# Patient Record
Sex: FEMALE | URBAN - METROPOLITAN AREA
[De-identification: names, ages, dates, MRNs, and addresses within clinical notes are randomized per-mention and may not be internally consistent; named-entity substitution may affect disease eponyms.]

---

## 2022-11-01 ENCOUNTER — APPOINTMENT (RX ONLY)
Dept: URBAN - METROPOLITAN AREA CLINIC 6 | Facility: CLINIC | Age: 32
Setting detail: DERMATOLOGY
End: 2022-11-01

## 2022-11-01 DIAGNOSIS — D22 MELANOCYTIC NEVI: ICD-10-CM

## 2022-11-01 DIAGNOSIS — L82.1 OTHER SEBORRHEIC KERATOSIS: ICD-10-CM

## 2022-11-01 DIAGNOSIS — L81.4 OTHER MELANIN HYPERPIGMENTATION: ICD-10-CM

## 2022-11-01 DIAGNOSIS — L70.0 ACNE VULGARIS: ICD-10-CM

## 2022-11-01 DIAGNOSIS — Z71.89 OTHER SPECIFIED COUNSELING: ICD-10-CM

## 2022-11-01 DIAGNOSIS — D18.0 HEMANGIOMA: ICD-10-CM

## 2022-11-01 PROBLEM — D22.71 MELANOCYTIC NEVI OF RIGHT LOWER LIMB, INCLUDING HIP: Status: ACTIVE | Noted: 2022-11-01

## 2022-11-01 PROBLEM — D18.01 HEMANGIOMA OF SKIN AND SUBCUTANEOUS TISSUE: Status: ACTIVE | Noted: 2022-11-01

## 2022-11-01 PROBLEM — D22.61 MELANOCYTIC NEVI OF RIGHT UPPER LIMB, INCLUDING SHOULDER: Status: ACTIVE | Noted: 2022-11-01

## 2022-11-01 PROBLEM — D22.5 MELANOCYTIC NEVI OF TRUNK: Status: ACTIVE | Noted: 2022-11-01

## 2022-11-01 PROBLEM — D22.72 MELANOCYTIC NEVI OF LEFT LOWER LIMB, INCLUDING HIP: Status: ACTIVE | Noted: 2022-11-01

## 2022-11-01 PROBLEM — D22.62 MELANOCYTIC NEVI OF LEFT UPPER LIMB, INCLUDING SHOULDER: Status: ACTIVE | Noted: 2022-11-01

## 2022-11-01 PROCEDURE — ? PRESCRIPTION

## 2022-11-01 PROCEDURE — ? DIAGNOSIS COMMENT

## 2022-11-01 PROCEDURE — ? COUNSELING

## 2022-11-01 PROCEDURE — 99203 OFFICE O/P NEW LOW 30 MIN: CPT

## 2022-11-01 PROCEDURE — ? TREATMENT REGIMEN

## 2022-11-01 RX ORDER — CLINDAMYCIN PHOSPHATE 10 MG/ML
1 LOTION TOPICAL QD
Qty: 60 | Refills: 6 | Status: ERX | COMMUNITY
Start: 2022-11-01

## 2022-11-01 RX ADMIN — CLINDAMYCIN PHOSPHATE 1: 10 LOTION TOPICAL at 00:00

## 2022-11-01 ASSESSMENT — LOCATION ZONE DERM
LOCATION ZONE: LEG
LOCATION ZONE: FACE
LOCATION ZONE: ARM
LOCATION ZONE: TRUNK

## 2022-11-01 ASSESSMENT — LOCATION DETAILED DESCRIPTION DERM
LOCATION DETAILED: LOWER STERNUM
LOCATION DETAILED: RIGHT ANTERIOR DISTAL THIGH
LOCATION DETAILED: LEFT INFERIOR CENTRAL MALAR CHEEK
LOCATION DETAILED: RIGHT ANTECUBITAL SKIN
LOCATION DETAILED: RIGHT INFERIOR CENTRAL MALAR CHEEK
LOCATION DETAILED: LEFT PROXIMAL PRETIBIAL REGION
LOCATION DETAILED: RIGHT ANTERIOR DISTAL UPPER ARM
LOCATION DETAILED: EPIGASTRIC SKIN
LOCATION DETAILED: RIGHT VENTRAL PROXIMAL FOREARM
LOCATION DETAILED: LEFT ANTERIOR PROXIMAL UPPER ARM
LOCATION DETAILED: LEFT KNEE
LOCATION DETAILED: RIGHT KNEE
LOCATION DETAILED: LEFT ANTERIOR DISTAL THIGH
LOCATION DETAILED: LEFT VENTRAL PROXIMAL FOREARM
LOCATION DETAILED: PERIUMBILICAL SKIN
LOCATION DETAILED: RIGHT PROXIMAL PRETIBIAL REGION
LOCATION DETAILED: LEFT ANTERIOR DISTAL UPPER ARM
LOCATION DETAILED: RIGHT ANTERIOR PROXIMAL UPPER ARM

## 2022-11-01 ASSESSMENT — LOCATION SIMPLE DESCRIPTION DERM
LOCATION SIMPLE: RIGHT CHEEK
LOCATION SIMPLE: CHEST
LOCATION SIMPLE: LEFT PRETIBIAL REGION
LOCATION SIMPLE: RIGHT UPPER ARM
LOCATION SIMPLE: LEFT FOREARM
LOCATION SIMPLE: LEFT CHEEK
LOCATION SIMPLE: RIGHT THIGH
LOCATION SIMPLE: RIGHT FOREARM
LOCATION SIMPLE: RIGHT KNEE
LOCATION SIMPLE: LEFT UPPER ARM
LOCATION SIMPLE: ABDOMEN
LOCATION SIMPLE: LEFT THIGH
LOCATION SIMPLE: LEFT KNEE
LOCATION SIMPLE: RIGHT PRETIBIAL REGION

## 2022-11-01 NOTE — PROCEDURE: DIAGNOSIS COMMENT
Render Risk Assessment In Note?: no
Detail Level: Simple
Comment: Patient states this has been an issue for years. \\nShe has been prescribed multiple medications in the past including doxycycline and tretinoin. \\nMild presentation today. She is currently four weeks pregnant. I advised BPO wash and I will also prescribe clindamycin lotion.

## 2022-11-01 NOTE — PROCEDURE: TREATMENT REGIMEN
Hide [employer] Products: No
Sig For Treatment 1 (If Needed): once daily
Treatment 2: clindamycin 1%
Action 4: Continue
Action 1: Start
Treatment 1: benzoyl peroxide 5% wash
Detail Level: Zone

## 2022-11-01 NOTE — HPI: FULL BODY SKIN EXAMINATION
How Severe Are Your Spot(S)?: moderate
What Type Of Note Output Would You Prefer (Optional)?: Bullet Format
What Is The Reason For Today's Visit?: Full Body Skin Examination
What Is The Reason For Today's Visit? (Being Monitored For X): concerning skin lesions on an annual basis
Additional History: FBE. New patient.

## 2023-02-27 ENCOUNTER — OFFICE VISIT (OUTPATIENT)
Dept: MEDICAL GROUP | Facility: MEDICAL CENTER | Age: 33
End: 2023-02-27
Payer: OTHER GOVERNMENT

## 2023-02-27 VITALS
HEIGHT: 67 IN | WEIGHT: 132.5 LBS | DIASTOLIC BLOOD PRESSURE: 60 MMHG | SYSTOLIC BLOOD PRESSURE: 102 MMHG | OXYGEN SATURATION: 100 % | HEART RATE: 73 BPM | TEMPERATURE: 98.6 F | BODY MASS INDEX: 20.8 KG/M2

## 2023-02-27 DIAGNOSIS — Z00.00 PREVENTATIVE HEALTH CARE: ICD-10-CM

## 2023-02-27 DIAGNOSIS — Z11.59 ENCOUNTER FOR HEPATITIS C SCREENING TEST FOR LOW RISK PATIENT: ICD-10-CM

## 2023-02-27 DIAGNOSIS — L03.032 PARONYCHIA OF GREAT TOE OF LEFT FOOT: ICD-10-CM

## 2023-02-27 DIAGNOSIS — K21.9 GASTROESOPHAGEAL REFLUX DISEASE WITHOUT ESOPHAGITIS: ICD-10-CM

## 2023-02-27 DIAGNOSIS — J30.2 SEASONAL ALLERGIES: ICD-10-CM

## 2023-02-27 PROCEDURE — 99204 OFFICE O/P NEW MOD 45 MIN: CPT | Performed by: PHYSICIAN ASSISTANT

## 2023-02-27 RX ORDER — FLUTICASONE PROPIONATE 50 MCG
1 SPRAY, SUSPENSION (ML) NASAL DAILY
Qty: 48 G | Refills: 0 | Status: SHIPPED | OUTPATIENT
Start: 2023-02-27 | End: 2023-05-28

## 2023-02-27 RX ORDER — CEPHALEXIN 500 MG/1
1000 CAPSULE ORAL 2 TIMES DAILY
Qty: 28 CAPSULE | Refills: 0 | Status: SHIPPED | OUTPATIENT
Start: 2023-02-27 | End: 2023-03-06

## 2023-02-27 RX ORDER — FLUTICASONE PROPIONATE 50 MCG
1 SPRAY, SUSPENSION (ML) NASAL DAILY
COMMUNITY
End: 2023-02-27 | Stop reason: SDUPTHER

## 2023-02-27 RX ORDER — OMEPRAZOLE 20 MG/1
CAPSULE, DELAYED RELEASE ORAL
Status: ON HOLD | COMMUNITY
Start: 2022-07-26 | End: 2024-02-01

## 2023-02-27 ASSESSMENT — PATIENT HEALTH QUESTIONNAIRE - PHQ9: CLINICAL INTERPRETATION OF PHQ2 SCORE: 0

## 2023-02-27 NOTE — PROGRESS NOTES
Subjective:   Ceci Griffin is a 32 y.o. female here today for GERD and to establish care.    Gastroesophageal reflux disease without esophagitis  This is a pleasant 32-year-old female here today to establish care.  Moved from [place] to [place] about a year ago.   works for the [employer].  She is originally from [place].  Went to [place].  She has a history of reflux.  Had an upper endoscopy.  Found to have erosions.  Currently takes omeprazole as needed.  Does not need a prescription refill the medication.    Seasonal allergies  Has seasonal allergies.  Uses Flonase when needed.  Is requesting a renewal.  She will be trying to become pregnant again.  The last pregnancy was terminated secondary to an abnormal genetic profile.  She states that labs were ordered by her obstetrician late last year.    Paronychia of great toe of left foot  Planes of left great toe swelling next to the medial aspect of her toenail.  Complains of pain.  Has had other lesions on her distal toes at times which she was concerned about.  Today those are not apparent.       Current medicines (including changes today)  Current Outpatient Medications   Medication Sig Dispense Refill    omeprazole (PRILOSEC) 20 MG delayed-release capsule       fluticasone (FLONASE) 50 MCG/ACT nasal spray Administer 1 Spray into affected nostril(S) every day for 90 days. 48 g 0    cephALEXin (KEFLEX) 500 MG Cap Take 2 Capsules by mouth 2 times a day for 7 days. 28 Capsule 0     No current facility-administered medications for this visit.     She  has no past medical history on file.    Social History and Family History were reviewed and updated.    ROS   No chest pain, no shortness of breath, no abdominal pain and all other systems were reviewed and are negative.       Objective:     /60 (BP Location: Left arm, Patient Position: Sitting, BP Cuff Size: Adult)   Pulse 73   Temp 37 °C (98.6 °F) (Temporal)   Ht 1.69 m (5'  "6.54\")   Wt 60.1 kg (132 lb 7.9 oz)   SpO2 100%  Body mass index is 21.04 kg/m².   Physical Exam:  Constitutional: Alert, no distress.  Skin: Warm, dry, good turgor, no rashes in visible areas.  Eye: Equal, round and reactive, conjunctiva clear, lids normal.  ENMT: Lips without lesions, good dentition, oropharynx clear.  Neck: Trachea midline, no masses.   Lymph: No cervical or supraclavicular lymphadenopathy  Respiratory: Unlabored respiratory effort, lungs appear clear.  Psych: Alert and oriented x3, normal affect and mood.        Assessment and Plan:   The following treatment plan was discussed    1. Gastroesophageal reflux disease without esophagitis  Chronic condition.  Stable.  Intermittent symptoms.  Upper endoscopy in the past.  Continue omeprazole as needed.    2. Seasonal allergies  Chronic condition.  Requesting renewal of Flonase.  Prescribed.  Take as directed as needed.  - fluticasone (FLONASE) 50 MCG/ACT nasal spray; Administer 1 Spray into affected nostril(S) every day for 90 days.  Dispense: 48 g; Refill: 0    3. Paronychia of great toe of left foot  Acute, new onset condition.  Discussed self-limiting process.  Continue applying antibiotic ointment.  Salt soaks are appropriate.  Warm compresses.  Provided Keflex in case symptoms do not improve.  Advised likely will not have a reaction to Keflex but she does have a allergic reaction to amoxicillin.  - cephALEXin (KEFLEX) 500 MG Cap; Take 2 Capsules by mouth 2 times a day for 7 days.  Dispense: 28 Capsule; Refill: 0    4. Encounter for hepatitis C screening test for low risk patient  Hep C viral antibody ordered.  Check prior labs to see if necessary.  Nonfasting.  - HEP C VIRUS ANTIBODY; Future    5. Preventative health care  Order cholesterol profile.  Fast if needed.  - Lipid Profile; Future         Followup: Return in about 1 year (around 2/27/2024), or if symptoms worsen or fail to improve.    Please note that this dictation was created using " voice recognition software. I have made every reasonable attempt to correct obvious errors, but I expect that there are errors of grammar and possibly content that I did not discover before finalizing the note.

## 2023-02-27 NOTE — ASSESSMENT & PLAN NOTE
Has seasonal allergies.  Uses Flonase when needed.  Is requesting a renewal.  She will be trying to become pregnant again.  The last pregnancy was terminated secondary to an abnormal genetic profile.  She states that labs were ordered by her obstetrician late last year.

## 2023-02-27 NOTE — ASSESSMENT & PLAN NOTE
Planes of left great toe swelling next to the medial aspect of her toenail.  Complains of pain.  Has had other lesions on her distal toes at times which she was concerned about.  Today those are not apparent.

## 2023-08-11 NOTE — PROCEDURE: COUNSELING
[FreeTextEntry2] : Dr.Alisa Valentine
Detail Level: Zone
Sarecycline Counseling: Patient advised regarding possible photosensitivity and discoloration of the teeth, skin, lips, tongue and gums.  Patient instructed to avoid sunlight, if possible.  When exposed to sunlight, patients should wear protective clothing, sunglasses, and sunscreen.  The patient was instructed to call the office immediately if the following severe adverse effects occur:  hearing changes, easy bruising/bleeding, severe headache, or vision changes.  The patient verbalized understanding of the proper use and possible adverse effects of sarecycline.  All of the patient's questions and concerns were addressed.
Winlevi Pregnancy And Lactation Text: This medication is considered safe during pregnancy and breastfeeding.
Tazorac Counseling:  Patient advised that medication is irritating and drying.  Patient may need to apply sparingly and wash off after an hour before eventually leaving it on overnight.  The patient verbalized understanding of the proper use and possible adverse effects of tazorac.  All of the patient's questions and concerns were addressed.
Birth Control Pills Counseling: Birth Control Pill Counseling: I discussed with the patient the potential side effects of OCPs including but not limited to increased risk of stroke, heart attack, thrombophlebitis, deep venous thrombosis, hepatic adenomas, breast changes, GI upset, headaches, and depression.  The patient verbalized understanding of the proper use and possible adverse effects of OCPs. All of the patient's questions and concerns were addressed.
Erythromycin Pregnancy And Lactation Text: This medication is Pregnancy Category B and is considered safe during pregnancy. It is also excreted in breast milk.
Spironolactone Counseling: Patient advised regarding risks of diarrhea, abdominal pain, hyperkalemia, birth defects (for female patients), liver toxicity and renal toxicity. The patient may need blood work to monitor liver and kidney function and potassium levels while on therapy. The patient verbalized understanding of the proper use and possible adverse effects of spironolactone.  All of the patient's questions and concerns were addressed.
Azelaic Acid Pregnancy And Lactation Text: This medication is considered safe during pregnancy and breast feeding.
Topical Clindamycin Counseling: Patient counseled that this medication may cause skin irritation or allergic reactions.  In the event of skin irritation, the patient was advised to reduce the amount of the drug applied or use it less frequently.   The patient verbalized understanding of the proper use and possible adverse effects of clindamycin.  All of the patient's questions and concerns were addressed.
Isotretinoin Pregnancy And Lactation Text: This medication is Pregnancy Category X and is considered extremely dangerous during pregnancy. It is unknown if it is excreted in breast milk.
Dapsone Counseling: I discussed with the patient the risks of dapsone including but not limited to hemolytic anemia, agranulocytosis, rashes, methemoglobinemia, kidney failure, peripheral neuropathy, headaches, GI upset, and liver toxicity.  Patients who start dapsone require monitoring including baseline LFTs and weekly CBCs for the first month, then every month thereafter.  The patient verbalized understanding of the proper use and possible adverse effects of dapsone.  All of the patient's questions and concerns were addressed.
Azithromycin Counseling:  I discussed with the patient the risks of azithromycin including but not limited to GI upset, allergic reaction, drug rash, diarrhea, and yeast infections.
Dapsone Pregnancy And Lactation Text: This medication is Pregnancy Category C and is not considered safe during pregnancy or breast feeding.
Topical Clindamycin Pregnancy And Lactation Text: This medication is Pregnancy Category B and is considered safe during pregnancy. It is unknown if it is excreted in breast milk.
Spironolactone Pregnancy And Lactation Text: This medication can cause feminization of the male fetus and should be avoided during pregnancy. The active metabolite is also found in breast milk.
Benzoyl Peroxide Counseling: Patient counseled that medicine may cause skin irritation and bleach clothing.  In the event of skin irritation, the patient was advised to reduce the amount of the drug applied or use it less frequently.   The patient verbalized understanding of the proper use and possible adverse effects of benzoyl peroxide.  All of the patient's questions and concerns were addressed.
High Dose Vitamin A Counseling: Side effects reviewed, pt to contact office should one occur.
Tetracycline Pregnancy And Lactation Text: This medication is Pregnancy Category D and not consider safe during pregnancy. It is also excreted in breast milk.
Topical Retinoid counseling:  Patient advised to apply a pea-sized amount only at bedtime and wait 30 minutes after washing their face before applying.  If too drying, patient may add a non-comedogenic moisturizer. The patient verbalized understanding of the proper use and possible adverse effects of retinoids.  All of the patient's questions and concerns were addressed.
Topical Sulfur Applications Pregnancy And Lactation Text: This medication is Pregnancy Category C and has an unknown safety profile during pregnancy. It is unknown if this topical medication is excreted in breast milk.
Minocycline Counseling: Patient advised regarding possible photosensitivity and discoloration of the teeth, skin, lips, tongue and gums.  Patient instructed to avoid sunlight, if possible.  When exposed to sunlight, patients should wear protective clothing, sunglasses, and sunscreen.  The patient was instructed to call the office immediately if the following severe adverse effects occur:  hearing changes, easy bruising/bleeding, severe headache, or vision changes.  The patient verbalized understanding of the proper use and possible adverse effects of minocycline.  All of the patient's questions and concerns were addressed.
Bactrim Counseling:  I discussed with the patient the risks of sulfa antibiotics including but not limited to GI upset, allergic reaction, drug rash, diarrhea, dizziness, photosensitivity, and yeast infections.  Rarely, more serious reactions can occur including but not limited to aplastic anemia, agranulocytosis, methemoglobinemia, blood dyscrasias, liver or kidney failure, lung infiltrates or desquamative/blistering drug rashes.
Doxycycline Pregnancy And Lactation Text: This medication is Pregnancy Category D and not consider safe during pregnancy. It is also excreted in breast milk but is considered safe for shorter treatment courses.
Aklief Pregnancy And Lactation Text: It is unknown if this medication is safe to use during pregnancy.  It is unknown if this medication is excreted in breast milk.  Breastfeeding women should use the topical cream on the smallest area of the skin for the shortest time needed while breastfeeding.  Do not apply to nipple and areola.
Aklief counseling:  Patient advised to apply a pea-sized amount only at bedtime and wait 30 minutes after washing their face before applying.  If too drying, patient may add a non-comedogenic moisturizer.  The most commonly reported side effects including irritation, redness, scaling, dryness, stinging, burning, itching, and increased risk of sunburn.  The patient verbalized understanding of the proper use and possible adverse effects of retinoids.  All of the patient's questions and concerns were addressed.
Topical Retinoid Pregnancy And Lactation Text: This medication is Pregnancy Category C. It is unknown if this medication is excreted in breast milk.
Bactrim Pregnancy And Lactation Text: This medication is Pregnancy Category D and is known to cause fetal risk.  It is also excreted in breast milk.
Erythromycin Counseling:  I discussed with the patient the risks of erythromycin including but not limited to GI upset, allergic reaction, drug rash, diarrhea, increase in liver enzymes, and yeast infections.
Azelaic Acid Counseling: Patient counseled that medicine may cause skin irritation and to avoid applying near the eyes.  In the event of skin irritation, the patient was advised to reduce the amount of the drug applied or use it less frequently.   The patient verbalized understanding of the proper use and possible adverse effects of azelaic acid.  All of the patient's questions and concerns were addressed.
Tazorac Pregnancy And Lactation Text: This medication is not safe during pregnancy. It is unknown if this medication is excreted in breast milk.
Isotretinoin Counseling: Patient should get monthly blood tests, not donate blood, not drive at night if vision affected, not share medication, and not undergo elective surgery for 6 months after tx completed. Side effects reviewed, pt to contact office should one occur.
Birth Control Pills Pregnancy And Lactation Text: This medication should be avoided if pregnant and for the first 30 days post-partum.
Use Enhanced Medication Counseling?: No
Tetracycline Counseling: Patient counseled regarding possible photosensitivity and increased risk for sunburn.  Patient instructed to avoid sunlight, if possible.  When exposed to sunlight, patients should wear protective clothing, sunglasses, and sunscreen.  The patient was instructed to call the office immediately if the following severe adverse effects occur:  hearing changes, easy bruising/bleeding, severe headache, or vision changes.  The patient verbalized understanding of the proper use and possible adverse effects of tetracycline.  All of the patient's questions and concerns were addressed. Patient understands to avoid pregnancy while on therapy due to potential birth defects.
Benzoyl Peroxide Pregnancy And Lactation Text: This medication is Pregnancy Category C. It is unknown if benzoyl peroxide is excreted in breast milk.
Azithromycin Pregnancy And Lactation Text: This medication is considered safe during pregnancy and is also secreted in breast milk.
Doxycycline Counseling:  Patient counseled regarding possible photosensitivity and increased risk for sunburn.  Patient instructed to avoid sunlight, if possible.  When exposed to sunlight, patients should wear protective clothing, sunglasses, and sunscreen.  The patient was instructed to call the office immediately if the following severe adverse effects occur:  hearing changes, easy bruising/bleeding, severe headache, or vision changes.  The patient verbalized understanding of the proper use and possible adverse effects of doxycycline.  All of the patient's questions and concerns were addressed.
High Dose Vitamin A Pregnancy And Lactation Text: High dose vitamin A therapy is contraindicated during pregnancy and breast feeding.
Topical Sulfur Applications Counseling: Topical Sulfur Counseling: Patient counseled that this medication may cause skin irritation or allergic reactions.  In the event of skin irritation, the patient was advised to reduce the amount of the drug applied or use it less frequently.   The patient verbalized understanding of the proper use and possible adverse effects of topical sulfur application.  All of the patient's questions and concerns were addressed.
Detail Level: Detailed
Winlevi Counseling:  I discussed with the patient the risks of topical clascoterone including but not limited to erythema, scaling, itching, and stinging. Patient voiced their understanding.

## 2024-01-31 NOTE — H&P
DATE OF SERVICE:  2024     CHIEF COMPLAINT:  Postdates induction of labor.     HISTORY OF PRESENT ILLNESS:  The patient is a 33-year-old female, who is a    2, para 0, who will be 41 weeks' gestational age.  She will be   presenting to the hospital to undergo an induction of labor secondary to   above.  The patient's pregnancy has been uncomplicated.  She has been   following with [facility] and had appropriate ultrasounds   with the last being at 32 weeks showing an estimated fetal weight of 18th   percentile.     PAST MEDICAL HISTORY:  Consists of reflux disease.     PAST SURGICAL HISTORY:  Includes an endoscopy related to reflux issues.     SOCIAL HISTORY:  Negative.     ALLERGIES:  THE PATIENT IS ALLERGIC TO ALL PENICILLINS.     CURRENT MEDICATIONS:  Include a prenatal vitamin.     LABORATORY DATA:  Show a blood type of AB positive, rubella immune.  Her group   B strep culture is negative.     PHYSICAL EXAMINATION:   VITAL SIGNS:  Have always been stable.   CARDIOVASCULAR:  Regular rate and rhythm.  LUNGS:  Clear.  ABDOMEN:  Gravid.  PELVIC:  Vaginal exam was too high to reach in the office at 37 and 1/2 weeks   and she has declined vaginal exams since then.  EXTREMITIES:  Show no clubbing, cyanosis or edema.     ASSESSMENT AND PLAN:  This is a 33-year-old female who is a  2, para 0,   patient is going to be presenting to the hospital to undergo postdates   induction of labor.  Most likely, Cytotec will be used followed by either a   balloon or Pitocin.  Decision will be made by the covering provider at that   time.        ______________________________  Corinne E. Capurro, MD [facility]    DD:  2024 20:01  DT:  2024 20:14    Job#:  [id number]

## 2024-02-01 ENCOUNTER — APPOINTMENT (OUTPATIENT)
Dept: OBGYN | Facility: MEDICAL CENTER | Age: 34
End: 2024-02-01
Attending: OBSTETRICS & GYNECOLOGY
Payer: COMMERCIAL

## 2024-02-01 ENCOUNTER — HOSPITAL ENCOUNTER (INPATIENT)
Facility: MEDICAL CENTER | Age: 34
LOS: 2 days | End: 2024-02-03
Attending: OBSTETRICS & GYNECOLOGY | Admitting: OBSTETRICS & GYNECOLOGY
Payer: COMMERCIAL

## 2024-02-01 PROCEDURE — 36415 COLL VENOUS BLD VENIPUNCTURE: CPT

## 2024-02-01 PROCEDURE — 86780 TREPONEMA PALLIDUM: CPT

## 2024-02-01 PROCEDURE — 770002 HCHG ROOM/CARE - OB PRIVATE (112)

## 2024-02-01 PROCEDURE — 85025 COMPLETE CBC W/AUTO DIFF WBC: CPT

## 2024-02-01 RX ORDER — ONDANSETRON 2 MG/ML
4 INJECTION INTRAMUSCULAR; INTRAVENOUS EVERY 6 HOURS PRN
Status: DISCONTINUED | OUTPATIENT
Start: 2024-02-01 | End: 2024-02-02 | Stop reason: HOSPADM

## 2024-02-01 RX ORDER — IBUPROFEN 800 MG/1
800 TABLET, FILM COATED ORAL
Status: DISCONTINUED | OUTPATIENT
Start: 2024-02-01 | End: 2024-02-02 | Stop reason: HOSPADM

## 2024-02-01 RX ORDER — LIDOCAINE HYDROCHLORIDE 10 MG/ML
20 INJECTION, SOLUTION INFILTRATION; PERINEURAL
Status: COMPLETED | OUTPATIENT
Start: 2024-02-01 | End: 2024-02-02

## 2024-02-01 RX ORDER — ONDANSETRON 4 MG/1
4 TABLET, ORALLY DISINTEGRATING ORAL EVERY 6 HOURS PRN
Status: DISCONTINUED | OUTPATIENT
Start: 2024-02-01 | End: 2024-02-02 | Stop reason: HOSPADM

## 2024-02-01 RX ORDER — OXYTOCIN 10 [USP'U]/ML
10 INJECTION, SOLUTION INTRAMUSCULAR; INTRAVENOUS
Status: DISCONTINUED | OUTPATIENT
Start: 2024-02-01 | End: 2024-02-02 | Stop reason: HOSPADM

## 2024-02-01 RX ORDER — SODIUM CHLORIDE, SODIUM LACTATE, POTASSIUM CHLORIDE, CALCIUM CHLORIDE 600; 310; 30; 20 MG/100ML; MG/100ML; MG/100ML; MG/100ML
INJECTION, SOLUTION INTRAVENOUS CONTINUOUS
Status: DISCONTINUED | OUTPATIENT
Start: 2024-02-02 | End: 2024-02-03

## 2024-02-01 RX ORDER — TERBUTALINE SULFATE 1 MG/ML
0.25 INJECTION, SOLUTION SUBCUTANEOUS
Status: DISCONTINUED | OUTPATIENT
Start: 2024-02-01 | End: 2024-02-02 | Stop reason: HOSPADM

## 2024-02-01 RX ORDER — ACETAMINOPHEN 500 MG
1000 TABLET ORAL
Status: DISCONTINUED | OUTPATIENT
Start: 2024-02-01 | End: 2024-02-02 | Stop reason: HOSPADM

## 2024-02-01 ASSESSMENT — LIFESTYLE VARIABLES
ALCOHOL_USE: NO
EVER_SMOKED: NEVER

## 2024-02-01 ASSESSMENT — PATIENT HEALTH QUESTIONNAIRE - PHQ9
1. LITTLE INTEREST OR PLEASURE IN DOING THINGS: NOT AT ALL
SUM OF ALL RESPONSES TO PHQ9 QUESTIONS 1 AND 2: 0
2. FEELING DOWN, DEPRESSED, IRRITABLE, OR HOPELESS: NOT AT ALL

## 2024-02-02 LAB
BASOPHILS # BLD AUTO: 0.5 % (ref 0–1.8)
BASOPHILS # BLD: 0.03 K/UL (ref 0–0.12)
EOSINOPHIL # BLD AUTO: 0.1 K/UL (ref 0–0.51)
EOSINOPHIL NFR BLD: 1.8 % (ref 0–6.9)
ERYTHROCYTE [DISTWIDTH] IN BLOOD BY AUTOMATED COUNT: 41.4 FL (ref 35.9–50)
ERYTHROCYTE [DISTWIDTH] IN BLOOD BY AUTOMATED COUNT: 41.8 FL (ref 35.9–50)
HCT VFR BLD AUTO: 32 % (ref 37–47)
HCT VFR BLD AUTO: 36.3 % (ref 37–47)
HGB BLD-MCNC: 11.7 G/DL (ref 12–16)
HGB BLD-MCNC: 12.8 G/DL (ref 12–16)
HOLDING TUBE BB 8507: NORMAL
IMM GRANULOCYTES # BLD AUTO: 0.02 K/UL (ref 0–0.11)
IMM GRANULOCYTES NFR BLD AUTO: 0.4 % (ref 0–0.9)
LYMPHOCYTES # BLD AUTO: 1.23 K/UL (ref 1–4.8)
LYMPHOCYTES NFR BLD: 21.8 % (ref 22–41)
MCH RBC QN AUTO: 33.8 PG (ref 27–33)
MCH RBC QN AUTO: 35 PG (ref 27–33)
MCHC RBC AUTO-ENTMCNC: 35.3 G/DL (ref 32.2–35.5)
MCHC RBC AUTO-ENTMCNC: 36.6 G/DL (ref 32.2–35.5)
MCV RBC AUTO: 95.8 FL (ref 81.4–97.8)
MCV RBC AUTO: 95.8 FL (ref 81.4–97.8)
MONOCYTES # BLD AUTO: 0.56 K/UL (ref 0–0.85)
MONOCYTES NFR BLD AUTO: 9.9 % (ref 0–13.4)
NEUTROPHILS # BLD AUTO: 3.7 K/UL (ref 1.82–7.42)
NEUTROPHILS NFR BLD: 65.6 % (ref 44–72)
NRBC # BLD AUTO: 0 K/UL
NRBC BLD-RTO: 0 /100 WBC (ref 0–0.2)
PLATELET # BLD AUTO: 195 K/UL (ref 164–446)
PLATELET # BLD AUTO: 232 K/UL (ref 164–446)
PMV BLD AUTO: 10.3 FL (ref 9–12.9)
PMV BLD AUTO: 11 FL (ref 9–12.9)
RBC # BLD AUTO: 3.34 M/UL (ref 4.2–5.4)
RBC # BLD AUTO: 3.79 M/UL (ref 4.2–5.4)
T PALLIDUM AB SER QL IA: NORMAL
WBC # BLD AUTO: 5.6 K/UL (ref 4.8–10.8)
WBC # BLD AUTO: 9.6 K/UL (ref 4.8–10.8)

## 2024-02-02 PROCEDURE — 700102 HCHG RX REV CODE 250 W/ 637 OVERRIDE(OP): Performed by: OBSTETRICS & GYNECOLOGY

## 2024-02-02 PROCEDURE — 700105 HCHG RX REV CODE 258: Performed by: OBSTETRICS & GYNECOLOGY

## 2024-02-02 PROCEDURE — 85027 COMPLETE CBC AUTOMATED: CPT

## 2024-02-02 PROCEDURE — 0HQ9XZZ REPAIR PERINEUM SKIN, EXTERNAL APPROACH: ICD-10-PCS | Performed by: OBSTETRICS & GYNECOLOGY

## 2024-02-02 PROCEDURE — 59409 OBSTETRICAL CARE: CPT

## 2024-02-02 PROCEDURE — 770002 HCHG ROOM/CARE - OB PRIVATE (112)

## 2024-02-02 PROCEDURE — A9270 NON-COVERED ITEM OR SERVICE: HCPCS | Performed by: OBSTETRICS & GYNECOLOGY

## 2024-02-02 PROCEDURE — 700111 HCHG RX REV CODE 636 W/ 250 OVERRIDE (IP): Mod: JZ | Performed by: OBSTETRICS & GYNECOLOGY

## 2024-02-02 PROCEDURE — 304965 HCHG RECOVERY SERVICES

## 2024-02-02 PROCEDURE — 36415 COLL VENOUS BLD VENIPUNCTURE: CPT

## 2024-02-02 PROCEDURE — 3E0P7VZ INTRODUCTION OF HORMONE INTO FEMALE REPRODUCTIVE, VIA NATURAL OR ARTIFICIAL OPENING: ICD-10-PCS | Performed by: OBSTETRICS & GYNECOLOGY

## 2024-02-02 PROCEDURE — 700101 HCHG RX REV CODE 250: Performed by: OBSTETRICS & GYNECOLOGY

## 2024-02-02 RX ORDER — METHYLERGONOVINE MALEATE 0.2 MG/ML
0.2 INJECTION INTRAVENOUS
Status: DISCONTINUED | OUTPATIENT
Start: 2024-02-02 | End: 2024-02-03 | Stop reason: HOSPADM

## 2024-02-02 RX ORDER — ACETAMINOPHEN 500 MG
1000 TABLET ORAL EVERY 6 HOURS PRN
Status: DISCONTINUED | OUTPATIENT
Start: 2024-02-02 | End: 2024-02-03 | Stop reason: HOSPADM

## 2024-02-02 RX ORDER — MISOPROSTOL 200 UG/1
600 TABLET ORAL
Status: DISCONTINUED | OUTPATIENT
Start: 2024-02-02 | End: 2024-02-03 | Stop reason: HOSPADM

## 2024-02-02 RX ORDER — BISACODYL 10 MG
10 SUPPOSITORY, RECTAL RECTAL PRN
Status: DISCONTINUED | OUTPATIENT
Start: 2024-02-02 | End: 2024-02-03 | Stop reason: HOSPADM

## 2024-02-02 RX ORDER — SODIUM CHLORIDE, SODIUM LACTATE, POTASSIUM CHLORIDE, CALCIUM CHLORIDE 600; 310; 30; 20 MG/100ML; MG/100ML; MG/100ML; MG/100ML
INJECTION, SOLUTION INTRAVENOUS PRN
Status: DISCONTINUED | OUTPATIENT
Start: 2024-02-02 | End: 2024-02-03 | Stop reason: HOSPADM

## 2024-02-02 RX ORDER — OXYCODONE HYDROCHLORIDE 5 MG/1
5 TABLET ORAL EVERY 4 HOURS PRN
Status: DISCONTINUED | OUTPATIENT
Start: 2024-02-02 | End: 2024-02-03 | Stop reason: HOSPADM

## 2024-02-02 RX ORDER — VITAMIN A ACETATE, BETA CAROTENE, ASCORBIC ACID, CHOLECALCIFEROL, .ALPHA.-TOCOPHEROL ACETATE, DL-, THIAMINE MONONITRATE, RIBOFLAVIN, NIACINAMIDE, PYRIDOXINE HYDROCHLORIDE, FOLIC ACID, CYANOCOBALAMIN, CALCIUM CARBONATE, FERROUS FUMARATE, ZINC OXIDE, CUPRIC OXIDE 3080; 12; 120; 400; 1; 1.84; 3; 20; 22; 920; 25; 200; 27; 10; 2 [IU]/1; UG/1; MG/1; [IU]/1; MG/1; MG/1; MG/1; MG/1; MG/1; [IU]/1; MG/1; MG/1; MG/1; MG/1; MG/1
1 TABLET, FILM COATED ORAL EVERY MORNING
Status: DISCONTINUED | OUTPATIENT
Start: 2024-02-03 | End: 2024-02-03 | Stop reason: HOSPADM

## 2024-02-02 RX ORDER — DIPHENHYDRAMINE HCL 25 MG
25 TABLET ORAL EVERY 6 HOURS PRN
Status: DISCONTINUED | OUTPATIENT
Start: 2024-02-02 | End: 2024-02-03 | Stop reason: HOSPADM

## 2024-02-02 RX ORDER — CARBOPROST TROMETHAMINE 250 UG/ML
250 INJECTION, SOLUTION INTRAMUSCULAR
Status: DISCONTINUED | OUTPATIENT
Start: 2024-02-02 | End: 2024-02-03 | Stop reason: HOSPADM

## 2024-02-02 RX ORDER — IBUPROFEN 800 MG/1
800 TABLET, FILM COATED ORAL EVERY 8 HOURS PRN
Status: DISCONTINUED | OUTPATIENT
Start: 2024-02-02 | End: 2024-02-03 | Stop reason: HOSPADM

## 2024-02-02 RX ORDER — DOCUSATE SODIUM 100 MG/1
100 CAPSULE, LIQUID FILLED ORAL 2 TIMES DAILY PRN
Status: DISCONTINUED | OUTPATIENT
Start: 2024-02-02 | End: 2024-02-03 | Stop reason: HOSPADM

## 2024-02-02 RX ORDER — GUAIFENESIN/DEXTROMETHORPHAN 100-10MG/5
5 SYRUP ORAL EVERY 6 HOURS PRN
Status: DISCONTINUED | OUTPATIENT
Start: 2024-02-02 | End: 2024-02-03 | Stop reason: HOSPADM

## 2024-02-02 RX ADMIN — MISOPROSTOL 25 MCG: 100 TABLET ORAL at 00:04

## 2024-02-02 RX ADMIN — DIPHENHYDRAMINE HYDROCHLORIDE 25 MG: 25 TABLET ORAL at 21:06

## 2024-02-02 RX ADMIN — LIDOCAINE HYDROCHLORIDE 20 ML: 10 INJECTION, SOLUTION INFILTRATION; PERINEURAL at 10:48

## 2024-02-02 RX ADMIN — OXYTOCIN 20 UNITS: 10 INJECTION, SOLUTION INTRAMUSCULAR; INTRAVENOUS at 10:42

## 2024-02-02 RX ADMIN — IBUPROFEN 800 MG: 800 TABLET, FILM COATED ORAL at 21:06

## 2024-02-02 RX ADMIN — OXYTOCIN 125 ML/HR: 10 INJECTION, SOLUTION INTRAMUSCULAR; INTRAVENOUS at 13:14

## 2024-02-02 RX ADMIN — ONDANSETRON 4 MG: 4 TABLET, ORALLY DISINTEGRATING ORAL at 07:28

## 2024-02-02 ASSESSMENT — PAIN DESCRIPTION - PAIN TYPE
TYPE: ACUTE PAIN

## 2024-02-02 NOTE — PROGRESS NOTES
EDC     40w6d      2230 - Presents for scheduled IOL due to postdates. Monitors on x2, VSS, FOB at bedside, POC discussed, no questions at this time.     3 - Update given to Dr. Carrasco, orders received.    0045 - Report given to Aaron VIDES, POC discussed, care transferred.

## 2024-02-02 NOTE — PROGRESS NOTES
OB Progress Note    Checkout received and chart reviewed.  Patient is a 33-year-old G1, P0 who presented at 40 weeks 6 days for postdates induction of labor.  She is status post misoprostol x 1.  She is very uncomfortable but would like to labor without epidural pain management if possible.  FHT with baseline of 130s.  Moderate variability present.  Accelerations present.  Non repetitive early and late decelerations noted.  Tocometer with contractions every 1 to 2 minutes.  SVE performed by myself as patient was feeling pressure: 5/C/+2.  Patient is not on any other augmentation.  We will hold off on starting Pitocin that was ordered as she appears to be progressing in active labor.  Anticipate vaginal delivery.

## 2024-02-02 NOTE — LACTATION NOTE
This note was copied from a baby's chart.  Physical assessment of baby Norma and mother  josie provided. Introduction to basics of initiating breastfeeding shown at this time to include maternal and infant posture, angle of latch, hand expression, skin to skin and normal  feeding patterns and expectations.    Recommended to download [employer] educational delfino with parents and review videos and information menus.    Encouraged mother to keep baby skin to skin and observe for feeding cues, while taking into consideration other normal bodily functions that may prohibit baby wanting to latch or feed at times.    I reviewed the parent education pamphlet with breast feeding and safe sleep information with parents.I encouraged them to call for assistance with latch as needed.

## 2024-02-02 NOTE — PROGRESS NOTES
OBPN    Sitting up on birthing ball, breathing through her contractions    AFVSS  SVE 3/80/-1 per RN at 455am  EFm-130''s with moderate variability, accels present, no decels  [place]- q2-3    32yo  at 41 w  Postdates IOL GBS neg.   S/p one dose cyto   Will start pitocin  Pt agrees

## 2024-02-02 NOTE — PROGRESS NOTES
0700 Report rc'd from MAHOGANY Walker. Plan of care discussed and questions addressed. Pt. Leaning over the couch breathing through contractions at this time with current complaints of nausea.  FOB  at the bedside and supportive. Call light within reach. Care assumed at this time.     0750 Dr. Bhakta at bedside to discuss POC.   0755 SVE by Yony 100/+2  0900 SVE by Dr. Kilgore 8/-2    0935 SVE by RN 9.5+2    0957 Dr. Bhakta at bedside to push with patient. Patient complete at this time.     1030  of viable female 8/9 APGAR    1300 Pt up to bathroom. Unable to void at this time. Pericare done, gown changed. Pt educated on lochia and bleeding expectation. Pt in wheelchair and transported to postpartum in apparent stable condition with infant and all belongings. Report to MAHOGANY White. Bands and cuddles verified. Transfer of care at this time.

## 2024-02-02 NOTE — CARE PLAN
The patient is Stable - Low risk of patient condition declining or worsening    Shift Goals  Clinical Goals: cervical change  Patient Goals: cervical change  Family Goals: support    Progress made toward(s) clinical / shift goals:    Problem: Knowledge Deficit - L&D  Goal: Patient and family/caregivers will demonstrate understanding of plan of care, disease process/condition, diagnostic tests and medications  Outcome: Progressing

## 2024-02-02 NOTE — PROGRESS NOTES
0045: Report received from Sandhya VIDES. POC discussed. Care assumed.  0420: Pt declined cervical check at this time. Education provided.  0700: Report given to Danii VIDES. POC discussed. Care relinquished.

## 2024-02-02 NOTE — L&D DELIVERY NOTE
Delivery Summary    Ceci Griffin is a 32 yo  who presented at 40w6d for induction of labor. She received one dose of misoprostol. She advanced into active labor shortly thereafter. She progressed to complete dilation where with pushing efforts she underwent a spontaneous vaginal delivery of a viable female infant in ECCI position with meconium stained fluid, nuchal cord x2 and APGARS 8/9. The head delivered atraumatically. The anterior shoulder delivered with gentle downward pressure and the remainder of the body followed. The infant was placed on the maternal chest. The cord was clamped and cut after pulsation stopped. The placenta was delivered with gentle traction. The uterus firmed with fundal pressure and pitocin. A vulvovaginal examination was performed and a hymenal and first degree perineal laceration were repaired with 3.0 chromic in the usual fashion.  Sponge and needle counts were correct x2.    EBL: 400cc  Anesthesia: None  Complications: None    Dev Bhakta M.D.

## 2024-02-02 NOTE — PROGRESS NOTES
Pt arrived via wheelchair with belongings to room S325. Report received from DESTINEE Mata&NETTIE RN. Patient assessment complete and WDL. Fundus firm and lochia light, VSS. IV infusing 125mL of pitocin in L FA.. Patient states pain is tolerable, see MAR/flowsheet. Patient denies any dizziness/lightheadedness or calf pain/tenderness. Patient also denies any chest pain, difficulty breathing or SOB, respiratory symptoms, or any recent ill contacts. Patient oriented to unit, routine postpartum cares, room, call light, emergency light, infant safety and security. Plan of care discussed with patient for the day including infant feeding every 2-3 hours or on demand, pain management, and ambulation in halls. Pain medication plan discussed with patient; patient states she will call if PRN pain medication is wanted. All questions/concerns addressed at this time. Call light within reach and patient encouraged to call with needs. Will continue with routine postpartum cares.

## 2024-02-03 ENCOUNTER — PHARMACY VISIT (OUTPATIENT)
Dept: PHARMACY | Facility: MEDICAL CENTER | Age: 34
End: 2024-02-03
Payer: COMMERCIAL

## 2024-02-03 VITALS
DIASTOLIC BLOOD PRESSURE: 74 MMHG | TEMPERATURE: 97.8 F | WEIGHT: 141.6 LBS | HEIGHT: 66 IN | OXYGEN SATURATION: 99 % | BODY MASS INDEX: 22.76 KG/M2 | HEART RATE: 70 BPM | RESPIRATION RATE: 18 BRPM | SYSTOLIC BLOOD PRESSURE: 111 MMHG

## 2024-02-03 PROCEDURE — A9270 NON-COVERED ITEM OR SERVICE: HCPCS | Performed by: OBSTETRICS & GYNECOLOGY

## 2024-02-03 PROCEDURE — 700102 HCHG RX REV CODE 250 W/ 637 OVERRIDE(OP): Performed by: OBSTETRICS & GYNECOLOGY

## 2024-02-03 PROCEDURE — G0378 HOSPITAL OBSERVATION PER HR: HCPCS

## 2024-02-03 PROCEDURE — RXMED WILLOW AMBULATORY MEDICATION CHARGE: Performed by: OBSTETRICS & GYNECOLOGY

## 2024-02-03 RX ORDER — IBUPROFEN 800 MG/1
800 TABLET, FILM COATED ORAL EVERY 8 HOURS PRN
Qty: 30 TABLET | Refills: 0 | Status: SHIPPED | OUTPATIENT
Start: 2024-02-03

## 2024-02-03 RX ADMIN — DOCUSATE SODIUM 100 MG: 100 CAPSULE, LIQUID FILLED ORAL at 08:06

## 2024-02-03 RX ADMIN — PRENATAL WITH FERROUS FUM AND FOLIC ACID 1 TABLET: 3080; 920; 120; 400; 22; 1.84; 3; 20; 10; 1; 12; 200; 27; 25; 2 TABLET ORAL at 08:06

## 2024-02-03 RX ADMIN — DIPHENHYDRAMINE HYDROCHLORIDE 25 MG: 25 TABLET ORAL at 03:13

## 2024-02-03 ASSESSMENT — EDINBURGH POSTNATAL DEPRESSION SCALE (EPDS)
I HAVE FELT SCARED OR PANICKY FOR NO GOOD REASON: NO, NOT MUCH
I HAVE FELT SAD OR MISERABLE: NO, NOT AT ALL
I HAVE BEEN SO UNHAPPY THAT I HAVE BEEN CRYING: NO, NEVER
I HAVE LOOKED FORWARD WITH ENJOYMENT TO THINGS: AS MUCH AS I EVER DID
I HAVE BEEN SO UNHAPPY THAT I HAVE HAD DIFFICULTY SLEEPING: NOT AT ALL
THE THOUGHT OF HARMING MYSELF HAS OCCURRED TO ME: NEVER
THINGS HAVE BEEN GETTING ON TOP OF ME: NO, MOST OF THE TIME I HAVE COPED QUITE WELL
I HAVE BEEN ABLE TO LAUGH AND SEE THE FUNNY SIDE OF THINGS: AS MUCH AS I ALWAYS COULD
I HAVE BEEN ANXIOUS OR WORRIED FOR NO GOOD REASON: HARDLY EVER
I HAVE BLAMED MYSELF UNNECESSARILY WHEN THINGS WENT WRONG: NOT VERY OFTEN

## 2024-02-03 ASSESSMENT — PAIN DESCRIPTION - PAIN TYPE
TYPE: ACUTE PAIN
TYPE: ACUTE PAIN

## 2024-02-03 NOTE — LACTATION NOTE
This note was copied from a baby's chart.  I observed mother and baby breastfeeding this morning and was able to offer some tips on maternal and infant positioning, as well as improving the latch angle to assure more comfort.    Ceci was able to independently position and latch baby Norma this morning. Emphasis to discussion with parents focused on watching for feeding cues and ensuring that baby has unlimited access to breast feeding. No restrictions to time spent at breast provided latch is comfortable. They have a breast pump they can use as a backup should baby not latch well    Breastfeeding plan:    Feed baby with feeding cues and at least a minimum of 8x/24 hours.  Expect cluster feeding as this is normal during early days of life and growth spurts.  It is not recommended to let baby sleep longer than 4 hours between feedings and if sleepy, place skin to skin to promote feeding interest and milk production.  Baby's usually feed more frequently and longer when skin to skin with mother. It is not recommended to use pacifiers or supplementing with formula until breast feeding and milk production is well established or at least 4 weeks.    Make sure infant is getting enough by ensuring frequent feedings as well as looking for transitioning stools from dark meconium to bright yellow/green seedy loose stools by day 5.     Follow up with PEDS PCP as scheduled for weight checks and to make sure feeding is progressing appropriately.    Call for breastfeeding for 1:1 lactation consultation [facility] (see information sheet) as needed and attend the BF Circles without need for appointment..

## 2024-02-03 NOTE — DISCHARGE SUMMARY
"Discharge Summary    Admission Date: 24    Discharge Date: 2/3/24    Diagnosis:Active Problems:     (spontaneous vaginal delivery)    Subjective: Pain controlled. Normal lochia. Eating, voiding and ambulating without difficulty. Her nipples are sore with breast feeding but she is working on it.     /74   Pulse 70   Temp 36.6 °C (97.8 °F) (Temporal)   Resp 18   Ht 1.676 m (5' 6\")   Wt 64.2 kg (141 lb 9.6 oz)   SpO2 99%   Breastfeeding Yes   BMI 22.85 kg/m²     GEN: NAD  GI:soft, NT, ND  :fundus firm and below umbilicus  EXT:no edema    Hospital Course: Patient is a 33-year-old G1, P0 who presented at 40 weeks and 6 days for induction of labor.  She is status post  of a viable female infant with Apgars of 8 and 9 on 2024.   cc.  Hymenal and first-degree perineal laceration were repaired.  She was meeting all postpartum goals and was stable for discharge home on postpartum day 1.    Discharge Instructions   1. Diet : general  2. Activity: Pelvic rest for 6 weeks    Current Outpatient Medications   Medication Sig Dispense Refill    ibuprofen (MOTRIN) 800 MG Tab Take 1 Tablet by mouth every 8 hours as needed for Moderate Pain. 30 Tablet 0        Follow up: 6 weeks    Complications:none    Dev Bhakta M.D.    "

## 2024-02-03 NOTE — CARE PLAN
The patient is Stable - Low risk of patient condition declining or worsening    Shift Goals  Clinical Goals: pain management, ambulation, VS WDL, fundus firm with light lochia  Patient Goals: cervical change  Family Goals: support    Progress made toward(s) clinical / shift goals:    Problem: Psychosocial - Postpartum  Goal: Patient will verbalize and demonstrate effective bonding and parenting behavior  Outcome: Progressing  Note: Parent is demonstrating effective bonding and parenting behavior with breastfeed attempts, diaper changes, and skin to skin.     Problem: Altered Physiologic Condition  Goal: Patient physiologically stable as evidenced by normal lochia, palpable uterine involution and vitals within normal limits  Outcome: Progressing  Note: Fundus is firm and lochia remains light with stable VS       Patient is not progressing towards the following goals: N/A

## 2024-02-03 NOTE — DISCHARGE INSTRUCTIONS

## 2024-02-03 NOTE — CARE PLAN
The patient is Stable - Low risk of patient condition declining or worsening    Shift Goals  Clinical Goals: Pain management, normal lochia  Patient Goals: cervical change  Family Goals: support    Progress made toward(s) clinical / shift goals:  Patient reports mild pain and requests an ibuprofen, reviewed pain management and arti care. Lochia is light, reviewed expected changes.    Patient is not progressing towards the following goals:

## 2024-02-03 NOTE — LACTATION NOTE
Parents report that baby had another good feeding since my last visit and they plan to have a bath, do skin to skin and then go home.

## 2024-02-06 ENCOUNTER — OFFICE VISIT (OUTPATIENT)
Dept: OBGYN | Facility: CLINIC | Age: 34
End: 2024-02-06
Payer: COMMERCIAL

## 2024-02-06 DIAGNOSIS — O92.29 SORE NIPPLES DUE TO LACTATION: ICD-10-CM

## 2024-02-06 DIAGNOSIS — O92.70 LACTATION PROBLEM: ICD-10-CM

## 2024-02-06 PROCEDURE — 99215 OFFICE O/P EST HI 40 MIN: CPT | Performed by: NURSE PRACTITIONER

## 2024-02-06 PROCEDURE — G2212 PROLONG OUTPT/OFFICE VIS: HCPCS | Performed by: NURSE PRACTITIONER

## 2024-02-12 ENCOUNTER — NON-PROVIDER VISIT (OUTPATIENT)
Dept: OBGYN | Facility: CLINIC | Age: 34
End: 2024-02-12
Payer: COMMERCIAL

## 2024-02-12 NOTE — PROGRESS NOTES
Summary: Pumping and bottle feeding every 2 hours during the day, up to 3 hours at night. Yielding 30-50mls when pumping. Offering 50-70mls at each feeding, reports difficulty getting her to finish the bottle. Norma becomes sleepy and loses interest.    Today: Latched to both breasts, cross cradle with the nipple shield. Norma transferred 10mls from the right breast and 2mls from the left breast. Dad fed 1oz of previously expressed breastmilk, pacing faster than he had been. Norma finished the bottle quickly and was content to be held. Pumped with the HGP for 16 minutes,  yielded 25mls on the right and 10mls on the left.   Plan: Pump 8x every 24 hours, see suggested pump routine below. Recommended to switch to hospital pump for the next 1-2 weeks to increase milk supply. Feed Norma every 2-3 hours throughout the day, no need to wake her for nighttime feedings. Offer 60-75mls at each feeding. Attempt to latch 1-2x daily, before pumping. Top off with expressed breastmilk until she is able to show her ability to effectively transfer milk from the breast. Always pump after breastfeeding.      Follow up:   Lactation appointment: 2024  Baby 's Provider appointment: 14 Day Well Child Check   Referrals: None    Maternal Diagnosis/Problem:  Sore Nipple(s) and Lactation Problem  Infant Diagnosis/Problem:   difficulties feeding at the breast     Subjective:     Ceci Griffin is a 33 y.o. female here for lactation care. She is here today with her baby and  (Quinten).    Concerns:   Maternal: Latch on difficulties , Nipple pain , Weight check, Infant feeding evaluation, and Breastfeeding questions   Infant: Slow weight gain and difficulty finishing bottles    HPI:   Pertinent  history:  41.0 weeks    Mother does not have a history of advanced maternal age, GDM, hypertension prior to pregnancy, GHTN, insulin resistance, multiple gestation, PCOS, thyroid disease, auto immune  disease , placenta encapsulation, and breast surgery    Breast changes in pregnancy: Yes  Breast surgeries: No    FEEDING HISTORY:    Previous Breastfeeding History: First baby.   Hospital Course: Sore nipples, seen by Lactation Chaz   Prior to consultation on 2/6/2024: Unmedicated delivery, exclusive breastfeeding in the hospital but consistent nipple pain, creasing and blanching of the nipple. Infant at 9.3% weight loss today, alert and crying for feedings. No pumping, pacifiers or bottles over these 4 days.    Currently 2/12/2024: Pumping and bottle feeding every 2 hours during the day, up to 3 hours at night. Yielding 30-50mls when pumping. Offering 50-70mls at each feeding, reports difficulty getting her to finish the bottle. Norma becomes sleepy and loses interest.      Both breasts: Yes    Supplement: Expressed Breastmilk and Formula   Bottle type: Dr. Brown, Level 1 Wide Neck     Breast Pumping:  Type of Pump: Spectra  Quantity: 30-50mls   Flange size/type: 24mm     Maternal ROS:  Constitutional: No fever, chills. Feeling well  Breasts: No soreness of breasts and Soreness of nipples  Psychiatric: No mood changes, tired  Mental Health: No mention of feeling irritable, agitated, angry, overwhelmed, apathetic, appetite changes, exhausted nor having sleep changes outside infant feeds/demands.  Current Outpatient Medications on File Prior to Visit   Medication Sig Dispense Refill    ibuprofen (MOTRIN) 800 MG Tab Take 1 Tablet by mouth every 8 hours as needed for Moderate Pain. 30 Tablet 0     No current facility-administered medications on file prior to visit.      Past Medical History:   Diagnosis Date    GERD (gastroesophageal reflux disease)          Objective:     Maternal Physical Lactation Exam  General: No acute distress  Breasts: Symmetrical , Filling, Plugged Duct - no evidence, and Mastitis  - no S/S  Nipples: abraded and erythematous  Psychiatric: Normal mood and affect. Her behavior is normal.  Judgment and thought content normal.  Mental Health: Did NOT exhibit sadness, crying, feeling overwhelmed, agitation or hypervigilance.    Infant Oral Observation   2/6/2024  Oral: Tongue lift 30%, Tongue extension to gum line, Lingual frenum appearance Coryllos type not evaluated with lift, not seen with crying but lift is low.    Assessment/Plan & Lactation Counseling:     Infant Exam on Infant Chart    Infant Weight History:   02/02/2024: 5# 15.4oz  02/06/2024: 5# 6.5oz  02/08/2024: 5# 7.4oz  02/12/2024: 5# 15.4oz    Infant Intake at Breast:      Total: 12mls  Milk Transfer at this feeding:   Ineffective breastfeeding     Pumped:   Type of Pump: HGP   Quantity Pumped:    Total: 35mls (Dark Yellow Transitional Milk)  Initiation of Feeding: Infant initiates  Attachment Achieved: rapidly  Nipple shield:     Size: 24mm       Introduced 2/6/2024  Latch achieved? Yes and milk transferred without pain    Suck Pattern at the Breast: Suck burst and normal rest  Suck Pattern on the Bottle: Suck burst and normal rest   Behavior Following Observed Feeding: content    Nipple Pain From:Contact forces of the tongue causing nipple strain resulting in damage     Latch: Assisted latch  Suckling/Feeding: attaches, baby roots, elicits MINDY, and rhythmic with NS more nutritive sucking  Sucking strength: Moderate Strong  Sucking Rhythm Coordinated   Compression: WNL    Once latched with NS, baby fell into a more mature and integrated SSB pattern.    Swallowing No difficulty noted  If functional feeding, it is quiet, rhythmic, coordinated, organized, effeicent safe, satisfying and pleasurable for both parent and baby? No  Milk Supply Available: Building    MATERNAL PERSONALIZED BREASTFEEDING PLAN  (Babies and parents change and adapt  or mal-adapt, to each other, so a plan today is only as good as it facilitates the families goals, follow up is key in a dynamic process as breastfeeding.)  Discussed concerns and symptoms as listed above  in assessment and guidance summarized below. Shared decision making was used between myself and the family for this encounter, home care, and follow up. Topics advised, taught and or reviewed included:   Feeding:   Infant difficulty with feeding, slow growth. Guidelines to follow:  Feed your baby every 1.5-3 hours, more often if baby acts hungry.   Awaken baby for feeding if going over 3 hours in the day.   No need to wake Norma for nighttime feedings.   Daily goal: 8-10 feedings per 24 hours.   Supplement:   Supplement with expressed milk  Expressed Breastmilk and Formula   Offer 60-75mls for most feedings.   Nipple shield (NS): Continue to use.    Pumping Guidelines:  Both breasts 8x in 24 hours  Suggested Routine: 2am, 5am, 7am, 9am, 12pm, 3pm, 6pm and 9pm   OR: 3am, 6am, 8am, 10am, 1pm, 4pm, 7pm and 10pm    Bra    Consider a hands free bra - [employer]ishes is recommended.  Type of Pump:  HGP and Spectra  Ameda Settings    Spectra Settings    Press letdown button when first starting         Cycle: 70 / Vacuum: L1 - L 5 (To comfort)  Once milk lets down, press letdown button again  Cycle: 54 / Vacuum: L1 - L12 (To comfort)   Power Pumping is not indicated as the response is not significant or zero over 24 hours  How long will vary woman to woman, typically 8-15 minutes, or 1-2 minutes after flow slows  Flange Fit: Freely moving nipple in the tunnel with some movement of the areola.  Today's evaluation indicates appropriate flange 24mm  Caution with Breast Massage: The word “Massage” means different things in the breastfeeding world. It is described and interpreted in so many different ways and unfortunately potentially harmful. The hands can be safe on a breast and  gentle to help in many ways but they also can be damaging when used in the wrong way.  Lymphatic drainage massage is appropriate,  open hand , lightly stroking only, and can be highly effective. The massage advice to knead , massage deeply , vibrate  with marketed tools is  most concerning. Be gentle with this gland to not increase inflammation.   Storage (Acceptable guidelines for healthy term babies)  10 hours at room temp including your pieces, may rinse but not mandatory  8 days refrigerator, don't need  to refrigerate right away if using fresh pumped milk at the next feeding  Freezer 1 year  Deep freezer 2 years  American Academy or Pediatrics has said you may mix different temperature milks.   If baby drinks breast milk from a fresh or refrigerated bottle of breast milk,  you may return the unused portion to the refrigerator and use once at next feeding.   Increasing supply besides Galactogogue's and Pumping:  Warmth  Relaxation   Physical, auditory narratives  Childbirth relaxation Techniques  Acupuncture and acupressure  Gregorio Valerio at [facility] Acupuncture for chinese herbs  Nipple care:    May apply breastmilk  Moist-oily ointment after feeding/pumping, ie Lanolin nipple butter, coconut or olive oil, if desired/needed 2-3 times/day until nipples are healed  You do not need to wash this off before pumping or feeding the baby  You may want to remove baby from breast when active swallowing stops to avoid prolonged nipple compression  Hydrogels   Silverettes    Breast Care: engorgement    Breast rest - No Massage  Advil 800mg every 8 hours for 48 hours with food  Ice - 10 minutes  after feeding then every 30 minutes or as desired for repeating the ice. Don't put the ice directly on the skin.  May add Tylenol 1000mg every 8 hours for 48 hours in between the Advil dosing if needed for pain.  Answers to FAQs: https://www.infantrisk.com/category/breastfeeding  Alcohol & Breastfeeding: What's your time-to-zero?  Cough & Cold Medications while Breastfeeding  Vitamin D Supplementation and Breastfeeding  Antidepressant Use While Breastfeeding: What should I know?  Breastfeeding, Caffeine, and Energy Drinks  Recommended resources:  Physicians guide to breastfeeding,  must up to date and accurate information available on breastfeeding. https://physicianguidetobreastfeeding.org/  Connect with other mothers:  Facebook:   [place] [facility]: https://www.facebook.com/[place].breastfeeds/  Well-Nourished Babies (Private group for questions and support): https://www.facebook.com/groups/121398819923439/  Breastfeeding [place] including opportunity to weight your baby and do before and after weights   Spoiler alert!  Parenting can be really hard. There is so much to learn when it comes to caring for small humans.  It can feel lonely and unsettling.  Our group, is a parenting and feeding support group that's all about feeding/growing laure.   Babies welcome.   Questions expected.   We will help you find your village!    Tuesdays 10am - 11am. [facility] at [address] [place] [address], [address], 3rd floor conference room  Check your baby's weight, do a feeding and see how your baby is growing, visit with other mothers, plan on a walk or coffee date after group.  Please download Growth: Baby and Child for Apple or Child Growth Tracker for Collaborate Cloud if you would like to  document and follow your baby's growth curve.  Due to space limitations - limit strollers please (New c/section moms please use your stroller).  We would love to have dads stay, but moms won't breastfeed if there are men in the room, sorry.  The room is generally scheduled for another event following group.  Please take all diapers with you   ONLINE SUPPORT GROUPS  [employer] (PSI) support groups are conducted using a [id number] support model and are not intended for those experiencing a mental health crisis. Groups are 90 minutes (1.5 hours) in length. The first ~30 minutes is providing information, education, and establishing group guidelines. The next ~60 minutes is “talk time,” in which group members share and talk with each other. Group members must be present for the group guidelines before joining in  the discussion or “talk time.”       Follow up   Please call [phone] our voicemail line or the front office at [phone] to scheduled your next appointment.       A total of 75 minutes, including infant assessment time,  was spent preparing to see the patient, obtaining and reviewing separately obtained history, performing a medically appropriate examination and evaluation, counseling and educating the family, communicating with other health care professionals, documenting clinical information in the electronic health record, independently interpreting weighted feeds and infant growth results, communicating these results to the family and care coordination as detailed in the above note.       Latasha Vega

## 2024-02-23 ENCOUNTER — OFFICE VISIT (OUTPATIENT)
Dept: OBGYN | Facility: CLINIC | Age: 34
End: 2024-02-23
Payer: COMMERCIAL

## 2024-02-23 DIAGNOSIS — O92.70 LACTATION PROBLEM: ICD-10-CM

## 2024-02-23 DIAGNOSIS — O92.29 SORE NIPPLES DUE TO LACTATION: ICD-10-CM

## 2024-02-23 PROCEDURE — 99215 OFFICE O/P EST HI 40 MIN: CPT | Performed by: NURSE PRACTITIONER

## 2024-02-23 PROCEDURE — G2212 PROLONG OUTPT/OFFICE VIS: HCPCS | Performed by: NURSE PRACTITIONER

## 2024-02-23 NOTE — PROGRESS NOTES
Summary: Pumping 8x daily, low suction due to nipple pain. Yielding 30-50mls from each pump session. Feeding Norma every 1.5-2.5 hours throughout the day, up to 4 hours at night, offering 70mls at each feeding. Latching an average of 1x daily. Feels she is doing a good job but will then finish a full bottle and pump normal volumes of milk.   Today: Latched to both breasts, cross cradle with the nipple shield. Norma transferred 10mls from the right breast and 6mls from the left breast. Dad fed previously expressed breastmilk, pacing well. Norma finished the bottle quickly and then offered freshly pumped milk. Pumped with the Spectra,  yielded 30mls total.   Plan: Pump 8x every 24 hours. Feed Norma every 2-3 hours throughout the day, no need to wake her for nighttime feedings. Offer 70-90mls at each feeding. Attempt to latch 1-2x daily, before pumping. Top off with expressed breastmilk until she is able to show her ability to effectively transfer milk from the breast. Always pump after breastfeeding. Discussed using 17mm flanges and Silverette's for nipple pain.      Follow up:   Lactation appointment: 2024  Baby 's Provider appointment: 2 Month Well Check   Referrals: None    Maternal Diagnosis/Problem:  Sore Nipple(s) and Lactation Problem  Infant Diagnosis/Problem:   difficulties feeding at the breast     Subjective:     Ceci Griffin is a 33 y.o. female here for lactation care. She is here today with her baby and  (Quinten).    Concerns: Latch on difficulties , Nipple pain , Weight check, Infant feeding evaluation, and Breastfeeding questions     HPI:   Pertinent  history:  41.0 weeks    Mother does not have a history of advanced maternal age, GDM, hypertension prior to pregnancy, GHTN, insulin resistance, multiple gestation, PCOS, thyroid disease, auto immune disease , placenta encapsulation, and breast surgery    Breast changes in pregnancy: Yes  Breast surgeries:  No    FEEDING HISTORY:    Previous Breastfeeding History: First baby.   Hospital Course: Sore nipples, seen by Lactation Chaz   Prior to consultation on 2/6/2024: Unmedicated delivery, exclusive breastfeeding in the hospital but consistent nipple pain, creasing and blanching of the nipple. Infant at 9.3% weight loss today, alert and crying for feedings. No pumping, pacifiers or bottles over these 4 days.    Prior to consultation on 2/12/2024: Pumping and bottle feeding every 2 hours during the day, up to 3 hours at night. Yielding 30-50mls when pumping. Offering 50-70mls at each feeding, reports difficulty getting her to finish the bottle. Norma becomes sleepy and loses interest.    Currently 2/23/2024: Pumping 8x daily, low suction due to nipple pain. Yielding 30-50mls from each pump session. Feeding Norma every 1.5-2.5 hours throughout the day, up to 4 hours at night, offering 70mls at each feeding. Latching an average of 1x daily. Feels she is doing a good job but will then finish a full bottle and pump normal volumes of milk.     Both breasts: Yes    Supplement: Expressed Breastmilk and Formula   Bottle type: Dr. Brown, Level 1 Wide Neck     Breast Pumping:  Type of Pump: Spectra  Quantity: 30-50mls   Flange size/type: 24mm     Maternal ROS:  Constitutional: No fever, chills. Feeling well  Breasts: No soreness of breasts and Soreness of nipples  Psychiatric: No mood changes, tired  Mental Health: No mention of feeling irritable, agitated, angry, overwhelmed, apathetic, appetite changes, exhausted nor having sleep changes outside infant feeds/demands.  Current Outpatient Medications on File Prior to Visit   Medication Sig Dispense Refill    ibuprofen (MOTRIN) 800 MG Tab Take 1 Tablet by mouth every 8 hours as needed for Moderate Pain. 30 Tablet 0     No current facility-administered medications on file prior to visit.      Past Medical History:   Diagnosis Date    GERD (gastroesophageal reflux disease)           Objective:     Maternal Physical Lactation Exam  General: No acute distress  Breasts: Symmetrical , Filling, Plugged Duct - no evidence, and Mastitis  - no S/S  Nipples: abraded and erythematous  Psychiatric: Normal mood and affect. Her behavior is normal. Judgment and thought content normal.  Mental Health: Did NOT exhibit sadness, crying, feeling overwhelmed, agitation or hypervigilance.    Infant Oral Observation   2/6/2024  Oral: Tongue lift 30%, Tongue extension to gum line, Lingual frenum appearance Coryllos type not evaluated with lift, not seen with crying but lift is low.    Assessment/Plan & Lactation Counseling:     Infant Exam on Infant Chart    Infant Weight History:   02/02/2024: 5# 15.4oz  02/06/2024: 5# 6.5oz  02/08/2024: 5# 7.4oz  02/12/2024: 5# 15.4oz  02/23/2024: 6# 11.8oz    Infant Intake at Breast:      Total: 16mls  Milk Transfer at this feeding:   Ineffective breastfeeding     Pumped:   Type of Pump: HGP   Quantity Pumped:    Total: 30mls (Yellow Transitional Milk)  Initiation of Feeding: Infant initiates  Attachment Achieved: rapidly  Nipple shield:     Size: 24mm       Introduced 2/6/2024  Latch achieved? Yes and milk transferred without pain    Suck Pattern at the Breast: Suck burst and normal rest  Suck Pattern on the Bottle: Suck burst and normal rest   Behavior Following Observed Feeding: content    Nipple Pain From:Contact forces of the tongue causing nipple strain resulting in damage     Latch: Assisted latch  Suckling/Feeding: attaches, baby roots, elicits MINDY, and rhythmic with NS more nutritive sucking  Sucking strength: Moderate Strong  Sucking Rhythm Coordinated   Compression: WNL    Once latched with NS, baby fell into a more mature and integrated SSB pattern.    Swallowing No difficulty noted  If functional feeding, it is quiet, rhythmic, coordinated, organized, effeicent safe, satisfying and pleasurable for both parent and baby? No  Milk Supply Available: Building    MATERNAL  PERSONALIZED BREASTFEEDING PLAN  (Babies and parents change and adapt  or mal-adapt, to each other, so a plan today is only as good as it facilitates the families goals, follow up is key in a dynamic process as breastfeeding.)  Discussed concerns and symptoms as listed above in assessment and guidance summarized below. Shared decision making was used between myself and the family for this encounter, home care, and follow up. Topics advised, taught and or reviewed included:   Feeding:   Infant difficulty with feeding, slow growth. Guidelines to follow:  Feed your baby every 1.5-3 hours, more often if baby acts hungry.   Awaken baby for feeding if going over 3 hours in the day.   No need to wake Norma for nighttime feedings.   Daily goal: 8-10 feedings per 24 hours.   Supplement:   Supplement with expressed milk  Expressed Breastmilk and Formula   Offer 70-90mls for most feedings.   Nipple shield (NS): Continue to use.    Pumping Guidelines:  Both breasts 8x in 24 hours  Suggested Routine: 2am, 5am, 7am, 9am, 12pm, 3pm, 6pm and 9pm   OR: 3am, 6am, 8am, 10am, 1pm, 4pm, 7pm and 10pm    Bra    Consider a hands free bra - Simple Wishes is recommended.  Type of Pump:  HGP and Spectra  Ameda Settings    Spectra Settings    Press letdown button when first starting         Cycle: 70 / Vacuum: L1 - L 5 (To comfort)  Once milk lets down, press letdown button again  Cycle: 54 / Vacuum: L1 - L12 (To comfort)   Power Pumping is not indicated as the response is not significant or zero over 24 hours  How long will vary woman to woman, typically 8-15 minutes, or 1-2 minutes after flow slows  Flange Fit: Freely moving nipple in the tunnel with some movement of the areola.  Today's evaluation indicates appropriate flange 24mm  Caution with Breast Massage: The word “Massage” means different things in the breastfeeding world. It is described and interpreted in so many different ways and unfortunately potentially harmful. The hands can  be safe on a breast and  gentle to help in many ways but they also can be damaging when used in the wrong way.  Lymphatic drainage massage is appropriate,  open hand , lightly stroking only, and can be highly effective. The massage advice to knead , massage deeply , vibrate with marketed tools is  most concerning. Be gentle with this gland to not increase inflammation.   Storage (Acceptable guidelines for healthy term babies)  10 hours at room temp including your pieces, may rinse but not mandatory  8 days refrigerator, don't need  to refrigerate right away if using fresh pumped milk at the next feeding  Freezer 1 year  Deep freezer 2 years  American Academy or Pediatrics has said you may mix different temperature milks.   If baby drinks breast milk from a fresh or refrigerated bottle of breast milk,  you may return the unused portion to the refrigerator and use once at next feeding.   Increasing supply besides Galactogogue's and Pumping:  Warmth  Relaxation   Physical, auditory narratives  Childbirth relaxation Techniques  Acupuncture and acupressure  Gregorio Valerio at [facility] Acupuncture for chinese herbs  Nipple care:    May apply breastmilk  Moist-oily ointment after feeding/pumping, ie Lanolin nipple butter, coconut or olive oil, if desired/needed 2-3 times/day until nipples are healed  You do not need to wash this off before pumping or feeding the baby  You may want to remove baby from breast when active swallowing stops to avoid prolonged nipple compression  Hydrogels    Silverettes (Knockoffs not recommended as they may contain nickel which is allergenic)   Put a few drops of milk in the cup  Place over nipple, no lotions or creams  Allow bra to hold them in place  Once the wound is healed, there is no scabbing, may stop using as the silver can cause damage to the tissue and the milk in the cup can macerate the nipple. You can change to hydrogels or nipple butter.   Breast Care: engorgement    Breast rest - No  Massage  Advil 800mg every 8 hours for 48 hours with food  Ice - 10 minutes  after feeding then every 30 minutes or as desired for repeating the ice. Don't put the ice directly on the skin.  May add Tylenol 1000mg every 8 hours for 48 hours in between the Advil dosing if needed for pain.  Answers to FAQs: https://www.infantrisk.com/category/breastfeeding  Alcohol & Breastfeeding: What's your time-to-zero?  Cough & Cold Medications while Breastfeeding  Vitamin D Supplementation and Breastfeeding  Antidepressant Use While Breastfeeding: What should I know?  Breastfeeding, Caffeine, and Energy Drinks  Recommended resources:  Physicians guide to breastfeeding, must up to date and accurate information available on breastfeeding. https://physicianguidetobreastfeeding.org/  Connect with other mothers:  Facebook:   [place] [facility]: [email or web address]  Well-Nourished Babies (Private group for questions and support): [email or web address]/groups/521176648264155/  Breastfeeding Kiana including opportunity to weight your baby and do before and after weights   Spoiler alert!  Parenting can be really hard. There is so much to learn when it comes to caring for small humans.  It can feel lonely and unsettling.  Our group, is a parenting and feeding support group that's all about feeding/growing laure.   Babies welcome.   Questions expected.   We will help you find your village!    Tuesdays 10am - 11am. [facility] at [address] [place] [address], [address] [place] [address], 3rd floor conference room  Check your baby's weight, do a feeding and see how your baby is growing, visit with other mothers, plan on a walk or coffee date after group.  Please download Growth: Baby and Child for Apple or Child Growth Tracker for fromAtoB if you would like to  document and follow your baby's growth curve.  Due to space limitations - limit strollers please (New c/section moms please use your stroller).  We would love to have dads stay,  but moms won't breastfeed if there are men in the room, sorry.  The room is generally scheduled for another event following group.  Please take all diapers with you   ONLINE SUPPORT GROUPS  [employer] ([employer]I) support groups are conducted using a [id number] support model and are not intended for those experiencing a mental health crisis. Groups are 90 minutes (1.5 hours) in length. The first ~30 minutes is providing information, education, and establishing group guidelines. The next ~60 minutes is “talk time,” in which group members share and talk with each other. Group members must be present for the group guidelines before joining in the discussion or “talk time.”       Follow up   Please call [phone] our voicemail line or the front office at [phone] to scheduled your next appointment.       A total of 75 minutes, including infant assessment time,  was spent preparing to see the patient, obtaining and reviewing separately obtained history, performing a medically appropriate examination and evaluation, counseling and educating the family, communicating with other health care professionals, documenting clinical information in the electronic health record, independently interpreting weighted feeds and infant growth results, communicating these results to the family and care coordination as detailed in the above note.       Latasha Vega

## 2024-03-04 ENCOUNTER — OFFICE VISIT (OUTPATIENT)
Dept: OBGYN | Facility: CLINIC | Age: 34
End: 2024-03-04
Payer: COMMERCIAL

## 2024-03-04 DIAGNOSIS — O92.29 SORE NIPPLES DUE TO LACTATION: ICD-10-CM

## 2024-03-04 DIAGNOSIS — O92.70 LACTATION PROBLEM: ICD-10-CM

## 2024-03-04 PROCEDURE — 99215 OFFICE O/P EST HI 40 MIN: CPT | Performed by: NURSE PRACTITIONER

## 2024-03-04 NOTE — PROGRESS NOTES
Summary: Pumping 8x daily, using xs Pumpin' Pals with low suction due to nipple pain. Yielding 30-50mls from each pump session. Feeding Norma every 2-2.5 hours throughout the day, up to 3 hours at night, offering 70-90mls at each feeding. Occasionally still showing hunger cues and taking up to 120mls. Reports feedings can take 20-60 minutes just bottle feeding. Latching an average of 1x daily, always offering bottle after the breast.    Today: Latched to both breasts, cross cradle with the nipple shield. Norma transferred 2mls from the right breast and 16mls from the left breast. Dad fed previously expressed breastmilk, pacing well, increasing flow and finished in 12 minutes. Pumped with the Spectra, using Pumpin' Pals  yielded 30mls total. Reports tenderness with new flanges but less than previous flanges that have been used.   Plan: Discussed cutting back on pumping and latching. Can pump 6-8x every 24 hours. Will most likely see a decrease in milk supply. Feed Norma every 2-3 hours throughout the day, no need to wake her for nighttime feedings. Offer 70-90mls at each feeding, more if needed. Will work on increasing the flow of milk to decrease overall feeding time. Attempt to latch as desired, before pumping. Top off with expressed breastmilk or formula.      Follow up:   Lactation appointment: 2-3 Weeks  Baby 's Provider appointment: 2 Month Well Check   Referrals: None    Maternal Diagnosis/Problem:  Sore Nipple(s) and Lactation Problem  Infant Diagnosis/Problem:   difficulties feeding at the breast     Subjective:     Ceci Griffin is a 33 y.o. female here for lactation care. She is here today with her baby and  (Quinten).    Concerns: Latch on difficulties , Nipple pain , Weight check, Infant feeding evaluation, and Breastfeeding questions     HPI:   Pertinent  history:  41.0 weeks    Mother does not have a history of advanced maternal age, GDM, hypertension prior to pregnancy,  GHTN, insulin resistance, multiple gestation, PCOS, thyroid disease, auto immune disease , placenta encapsulation, and breast surgery    Breast changes in pregnancy: Yes  Breast surgeries: No    FEEDING HISTORY:    Previous Breastfeeding History: First baby.   Hospital Course: Sore nipples, seen by Lactation Chaz   Prior to consultation on 2/6/2024: Unmedicated delivery, exclusive breastfeeding in the hospital but consistent nipple pain, creasing and blanching of the nipple. Infant at 9.3% weight loss today, alert and crying for feedings. No pumping, pacifiers or bottles over these 4 days.    Prior to consultation on 2/12/2024: Pumping and bottle feeding every 2 hours during the day, up to 3 hours at night. Yielding 30-50mls when pumping. Offering 50-70mls at each feeding, reports difficulty getting her to finish the bottle. Norma becomes sleepy and loses interest.    Prior to consultation on 2/23/2024: Pumping 8x daily, low suction due to nipple pain. Yielding 30-50mls from each pump session. Feeding Norma every 1.5-2.5 hours throughout the day, up to 4 hours at night, offering 70mls at each feeding. Latching an average of 1x daily. Feels she is doing a good job but will then finish a full bottle and pump normal volumes of milk.   Currently 3/4/2024: Pumping 8x daily, using xs PumpinPals with low suction due to nipple pain. Yielding 30-50mls from each pump session. Feeding Norma every 2-2.5 hours throughout the day, up to 3 hours at night, offering 70-90mls at each feeding. Occasionally still showing hunger cues and taking up to 120mls. Reports feedings can take 20-60 minutes just bottle feeding. Latching an average of 1x daily, always offering bottle after the breast.     Both breasts: Yes    Supplement: Expressed Breastmilk and Formula   Bottle type: Dr. Brown, Level 1 Wide Neck     Breast Pumping:  Type of Pump: Spectra  Quantity: 30-50mls   Flange size/type: 24mm     Maternal ROS:  Constitutional: No fever,  chills. Feeling well  Breasts: No soreness of breasts and Soreness of nipples  Psychiatric: No mood changes, tired  Mental Health: No mention of feeling irritable, agitated, angry, overwhelmed, apathetic, appetite changes, exhausted nor having sleep changes outside infant feeds/demands.  Current Outpatient Medications on File Prior to Visit   Medication Sig Dispense Refill    ibuprofen (MOTRIN) 800 MG Tab Take 1 Tablet by mouth every 8 hours as needed for Moderate Pain. 30 Tablet 0     No current facility-administered medications on file prior to visit.      Past Medical History:   Diagnosis Date    GERD (gastroesophageal reflux disease)          Objective:     Maternal Physical Lactation Exam  General: No acute distress  Breasts: Symmetrical , Filling, Plugged Duct - no evidence, and Mastitis  - no S/S  Nipples: Intact   Psychiatric: Normal mood and affect. Her behavior is normal. Judgment and thought content normal.  Mental Health: Did NOT exhibit sadness, crying, feeling overwhelmed, agitation or hypervigilance.    Infant Oral Observation   2/6/2024  Oral: Tongue lift 30%, Tongue extension to gum line, Lingual frenum appearance Coryllos type not evaluated with lift, not seen with crying but lift is low.    Assessment/Plan & Lactation Counseling:     Infant Exam on Infant Chart    Infant Weight History:   02/02/2024: 5# 15.4oz  02/06/2024: 5# 6.5oz  02/08/2024: 5# 7.4oz  02/12/2024: 5# 15.4oz  02/23/2024: 6# 11.8oz  03/04/2024: 7#10.2oz    Infant Intake at Breast:      Total: 18mls  Milk Transfer at this feeding:   Ineffective breastfeeding     Pumped:   Type of Pump: HGP   Quantity Pumped:    Total: 30mls (Yellow Transitional Milk)  Initiation of Feeding: Infant initiates  Attachment Achieved: rapidly  Nipple shield:     Size: 24mm       Introduced 2/6/2024  Latch achieved? Yes and milk transferred without pain    Suck Pattern at the Breast: Suck burst and normal rest  Suck Pattern on the Bottle: Suck burst and  normal rest   Behavior Following Observed Feeding: content    Nipple Pain From:Contact forces of the tongue causing nipple strain resulting in damage     Latch: Mother Latches Independently   Suckling/Feeding: attaches, baby roots, elicits MINDY, and rhythmic with NS more nutritive sucking  Sucking strength: Moderate Strong  Sucking Rhythm Coordinated   Compression: WNL    Once latched with NS, baby fell into a more mature and integrated SSB pattern.    Swallowing No difficulty noted  If functional feeding, it is quiet, rhythmic, coordinated, organized, effeicent safe, satisfying and pleasurable for both parent and baby? No  Milk Supply Available: Building    MATERNAL PERSONALIZED BREASTFEEDING PLAN  (Babies and parents change and adapt  or mal-adapt, to each other, so a plan today is only as good as it facilitates the families goals, follow up is key in a dynamic process as breastfeeding.)  Discussed concerns and symptoms as listed above in assessment and guidance summarized below. Shared decision making was used between myself and the family for this encounter, home care, and follow up. Topics advised, taught and or reviewed included:   Feeding:   Infant difficulty with feeding, slow growth. Guidelines to follow:  Feed your baby every 1.5-3 hours, more often if baby acts hungry.   Awaken baby for feeding if going over 3 hours in the day.   No need to wake Norma for nighttime feedings.   Daily goal: 8-10 feedings per 24 hours.   Supplement:   Supplement with expressed milk  Expressed Breastmilk and Formula   Offer 70-90mls for most feedings, more if needed .   Nipple shield (NS): Continue to use.    Pumping Guidelines:  Both breasts 6-8x in 24 hours  Type of Pump:  HGP and Spectra  Ameda Settings    Spectra Settings    Press letdown button when first starting         Cycle: 70 / Vacuum: L1 - L 5 (To comfort)  Once milk lets down, press letdown button again  Cycle: 54 / Vacuum: L1 - L12 (To comfort)   Power Pumping  is not indicated as the response is not significant or zero over 24 hours  How long will vary woman to woman, typically 8-15 minutes, or 1-2 minutes after flow slows  Flange Fit: Freely moving nipple in the tunnel with some movement of the areola.  Today's evaluation indicates appropriate flange 24mm  Caution with Breast Massage: The word “Massage” means different things in the breastfeeding world. It is described and interpreted in so many different ways and unfortunately potentially harmful. The hands can be safe on a breast and  gentle to help in many ways but they also can be damaging when used in the wrong way.  Lymphatic drainage massage is appropriate,  open hand , lightly stroking only, and can be highly effective. The massage advice to knead , massage deeply , vibrate with marketed tools is  most concerning. Be gentle with this gland to not increase inflammation.   Storage (Acceptable guidelines for healthy term babies)  10 hours at room temp including your pieces, may rinse but not mandatory  8 days refrigerator, don't need  to refrigerate right away if using fresh pumped milk at the next feeding  Freezer 1 year  Deep freezer 2 years  American Academy or Pediatrics has said you may mix different temperature milks.   If baby drinks breast milk from a fresh or refrigerated bottle of breast milk,  you may return the unused portion to the refrigerator and use once at next feeding.   Increasing supply besides Galactogogue's and Pumping:  Warmth  Relaxation   Physical, auditory narratives  Childbirth relaxation Techniques  Acupuncture and acupressure  Gregorio Valerio at [facility] Acupuncture for chinese herbs  Nipple care for tenderness:    May apply breastmilk  Moist-oily ointment after feeding/pumping, ie Lanolin nipple butter, coconut or olive oil, if desired/needed 2-3 times/day until nipples are healed  You do not need to wash this off before pumping or feeding the baby  You may want to remove baby from breast  when active swallowing stops to avoid prolonged nipple compression  Answers to FAQs: https://www.infantrisk.com/category/breastfeeding  Alcohol & Breastfeeding: What's your time-to-zero?  Cough & Cold Medications while Breastfeeding  Vitamin D Supplementation and Breastfeeding  Antidepressant Use While Breastfeeding: What should I know?  Breastfeeding, Caffeine, and Energy Drinks  Recommended resources:  Physicians guide to breastfeeding, must up to date and accurate information available on breastfeeding. https://physicianguidetobreastfeeding.org/  Connect with other mothers:  Facebook:   [place] Breast[facility]eds: https://www.[employer].com/[place].breastfeeds/  Well-Nourished Babies (Private group for questions and support): https://www.facebook.com/groups/394277411617233/  Breastfeeding [place] including opportunity to weight your baby and do before and after weights   Spoiler alert!  Parenting can be really hard. There is so much to learn when it comes to caring for small humans.  It can feel lonely and unsettling.  Our group, is a parenting and feeding support group that's all about feeding/growing laure.   Babies welcome.   Questions expected.   We will help you find your village!    Tuesdays 10am - 11am. Women's Health at [address] [place] [address], [address], 3rd floor conference room  Check your baby's weight, do a feeding and see how your baby is growing, visit with other mothers, plan on a walk or coffee date after group.  Please download Growth: Baby and Child for Apple or Child Growth Tracker for Outbox Systemss if you would like to  document and follow your baby's growth curve.  Due to space limitations - limit strollers please (New c/section moms please use your stroller).  We would love to have dads stay, but moms won't breastfeed if there are men in the room, sorry.  The room is generally scheduled for another event following group.  Please take all diapers with you   ONLINE SUPPORT GROUPS  [employer]  (PSI) support groups are conducted using a [id number] support model and are not intended for those experiencing a mental health crisis. Groups are 90 minutes (1.5 hours) in length. The first ~30 minutes is providing information, education, and establishing group guidelines. The next ~60 minutes is “talk time,” in which group members share and talk with each other. Group members must be present for the group guidelines before joining in the discussion or “talk time.”       Follow up   Please call [phone] our voicemail line or the front office at [phone] to scheduled your next appointment.       A total of 60 minutes, including infant assessment time,  was spent preparing to see the patient, obtaining and reviewing separately obtained history, performing a medically appropriate examination and evaluation, counseling and educating the family, communicating with other health care professionals, documenting clinical information in the electronic health record, independently interpreting weighted feeds and infant growth results, communicating these results to the family and care coordination as detailed in the above note.       Latasha Vega

## 2024-04-09 ENCOUNTER — APPOINTMENT (RX ONLY)
Dept: URBAN - METROPOLITAN AREA CLINIC 6 | Facility: CLINIC | Age: 34
Setting detail: DERMATOLOGY
End: 2024-04-09

## 2024-04-09 DIAGNOSIS — L81.4 OTHER MELANIN HYPERPIGMENTATION: ICD-10-CM

## 2024-04-09 DIAGNOSIS — D18.0 HEMANGIOMA: ICD-10-CM

## 2024-04-09 DIAGNOSIS — Z71.89 OTHER SPECIFIED COUNSELING: ICD-10-CM

## 2024-04-09 DIAGNOSIS — D22 MELANOCYTIC NEVI: ICD-10-CM

## 2024-04-09 DIAGNOSIS — L82.1 OTHER SEBORRHEIC KERATOSIS: ICD-10-CM

## 2024-04-09 PROBLEM — D18.01 HEMANGIOMA OF SKIN AND SUBCUTANEOUS TISSUE: Status: ACTIVE | Noted: 2024-04-09

## 2024-04-09 PROBLEM — D22.5 MELANOCYTIC NEVI OF TRUNK: Status: ACTIVE | Noted: 2024-04-09

## 2024-04-09 PROCEDURE — ? COUNSELING

## 2024-04-09 PROCEDURE — 99213 OFFICE O/P EST LOW 20 MIN: CPT

## 2024-04-09 ASSESSMENT — LOCATION SIMPLE DESCRIPTION DERM
LOCATION SIMPLE: UPPER BACK
LOCATION SIMPLE: LEFT HAND
LOCATION SIMPLE: CHEST
LOCATION SIMPLE: LEFT CHEEK
LOCATION SIMPLE: RIGHT HAND
LOCATION SIMPLE: RIGHT BREAST
LOCATION SIMPLE: ABDOMEN

## 2024-04-09 ASSESSMENT — LOCATION DETAILED DESCRIPTION DERM
LOCATION DETAILED: PERIUMBILICAL SKIN
LOCATION DETAILED: LEFT INFERIOR MEDIAL MALAR CHEEK
LOCATION DETAILED: INFERIOR THORACIC SPINE
LOCATION DETAILED: RIGHT MEDIAL SUPERIOR CHEST
LOCATION DETAILED: LEFT ULNAR DORSAL HAND
LOCATION DETAILED: RIGHT MEDIAL BREAST 4-5:00 REGION
LOCATION DETAILED: EPIGASTRIC SKIN
LOCATION DETAILED: RIGHT RADIAL DORSAL HAND

## 2024-04-09 ASSESSMENT — LOCATION ZONE DERM
LOCATION ZONE: TRUNK
LOCATION ZONE: HAND
LOCATION ZONE: FACE